# Patient Record
Sex: MALE | Race: WHITE
[De-identification: names, ages, dates, MRNs, and addresses within clinical notes are randomized per-mention and may not be internally consistent; named-entity substitution may affect disease eponyms.]

---

## 2018-05-02 ENCOUNTER — HOSPITAL ENCOUNTER (EMERGENCY)
Dept: HOSPITAL 60 - LB.ED | Age: 73
Discharge: HOME | End: 2018-05-02
Payer: MEDICARE

## 2018-05-02 VITALS — SYSTOLIC BLOOD PRESSURE: 122 MMHG | DIASTOLIC BLOOD PRESSURE: 63 MMHG

## 2018-05-02 DIAGNOSIS — Z91.09: ICD-10-CM

## 2018-05-02 DIAGNOSIS — I48.91: ICD-10-CM

## 2018-05-02 DIAGNOSIS — R07.9: Primary | ICD-10-CM

## 2018-05-02 DIAGNOSIS — Z79.899: ICD-10-CM

## 2018-05-02 RX ADMIN — NITROGLYCERIN ONE MG: 0.4 TABLET SUBLINGUAL at 15:36

## 2018-05-02 NOTE — EDM.PDOC
ED HPI GENERAL MEDICAL PROBLEM





- General


Chief Complaint: Chest Pain


Stated Complaint: left arm pain


Time Seen by Provider: 05/02/18 14:20


Source of Information: Reports: Patient, RN


History Limitations: Reports: No Limitations





- History of Present Illness


INITIAL COMMENTS - FREE TEXT/NARRATIVE: 


73 yr male presents with left arm pain, weakness and some nausea.  He was 

fishing today and holding his fishing pole and noted this pain to the left arm.

  States hx of a-fib and take Metoprolol and xarelto and pravastatin.  States 

he ate out last night and doesn't know if he had something to eat that upset 

his stomach.  States he hasn't ate today, but has been drinking water and did 

have 2 bottles of water today.  Pt is alert, talkative and no acute distress.  

States he does come up here to [place] about 3x/year.  He used to work with 

[employer]  and is retired now.  Rates his pain a "1" and states this is the best he 

has felt today.  Pain was a 3/4 at its worse.  Will do work-up for cardiac with 

EKG, tropnons, and CBC and CMP.  Vital signs are stable and EKG shows Sinus 

rhythm with aberrant contraction.  





Onset: Today


Onset Date: 05/02/18


Onset Time: 10:30


Duration: Improving


Location: Reports: Upper Extremity, Left


Severity: Mild


Associated Symptoms: Reports: Other (pain into rib cage, side and back)


  ** Left Shoulder


Pain Score (Numeric/FACES): 1





- Related Data


 Allergies











Allergy/AdvReac Type Severity Reaction Status Date / Time


 


cat dander Allergy  Itching Verified 05/02/18 14:44











Home Meds: 


 Home Meds





Metoprolol Tartrate 25 mg PO BID 05/02/18 [History]


Pravastatin [Pravachol] 40 mg PO BEDTIME 05/02/18 [History]


Rivaroxaban [Xarelto] 20 mg PO DAILY 05/02/18 [History]











ED ROS GENERAL





- Review of Systems


Review Of Systems: See Below


Constitutional: Reports: Weakness


HEENT: Reports: Glasses


Respiratory: Reports: No Symptoms


Cardiovascular: Reports: Other (hx of a-fib and is taking metoprolol and 

xarelto.)


GI/Abdominal: Reports: Nausea.  Denies: Abdominal Pain, Decreased Appetite


Musculoskeletal: Reports: Arm Pain (left arm pain)


Skin: Reports: No Symptoms


Neurological: Reports: Weakness





ED EXAM, GENERAL





- Physical Exam


Exam: See Below


Exam Limited By: No Limitations


General Appearance: Alert, No Apparent Distress


Ears: Hearing Grossly Normal


Throat/Mouth: No Airway Compromise


Head: Atraumatic, Normocephalic


Neck: Supple, Non-Tender


Respiratory/Chest: No Respiratory Distress, Lungs Clear, Normal Breath Sounds, 

Chest Non-Tender


Cardiovascular: Normal Peripheral Pulses, Regular Rate, Rhythm, No Edema





Course





- Vital Signs


Last Recorded V/S: 


 Last Vital Signs











Temp  98.9 F   05/02/18 14:20


 


Pulse  64   05/02/18 14:55


 


Resp  18   05/02/18 14:55


 


BP  122/63   05/02/18 15:36


 


Pulse Ox  97   05/02/18 14:34














- Orders/Labs/Meds


Orders: 


 Active Orders 24 hr











 Category Date Time Status


 


 Cardiac Monitoring [RC] .As Directed Care  05/02/18 14:33 Active


 


 EKG Documentation Completion [RC] ASDIRECTED Care  05/02/18 14:34 Active


 


 Saline Lock Insert [OM.PC] Stat Oth  05/02/18 14:33 Ordered











Labs: 


 Laboratory Tests











  05/02/18 05/02/18 Range/Units





  14:40 14:40 


 


WBC  6.7   (4.0-11.0)  K/uL


 


RBC  4.36 L   (4.50-6.50)  M/uL


 


Hgb  13.1   (13.0-18.0)  g/dL


 


Hct  38.1 L   (40.0-54.0)  %


 


MCV  87   (76-96)  fL


 


MCH  30.0   (27.0-32.0)  pg


 


MCHC  34.4   (31.0-35.0)  g/dL


 


RDW  12.9   (11.0-16.0)  %


 


Plt Count  163   (150-400)  K/uL


 


MPV  9.7   (6.0-10.0)  fL


 


Neut % (Auto)  84.8 H   (45.0-70.0)  %


 


Lymph % (Auto)  6.7 L   (20.0-40.0)  %


 


Mono % (Auto)  7.8   (3.0-10.0)  %


 


Eos % (Auto)  0.6 L   (1.0-5.0)  %


 


Baso % (Auto)  0.1   (0.0-0.5)  %


 


Neut # (Auto)  5.67   (2.00-7.50)  K/uL


 


Lymph # (Auto)  0.45 L   (1.50-4.00)  K/uL


 


Mono # (Auto)  0.52   (0.20-0.80)  K/uL


 


Eos # (Auto)  0.04   (0.04-0.40)  K/uL


 


Baso # (Auto)  0.01 L   (0.02-0.10)  K/uL


 


Sodium   141  (136-145)  mmol/L


 


Potassium   3.9  (3.5-5.1)  mmol/L


 


Chloride   103  ()  mmol/L


 


Carbon Dioxide   27.0  (21.0-32.0)  mmol/L


 


Anion Gap   14.9  (5.0-15.0)  mmol/L


 


BUN   20  (8-26)  mg/dL


 


Creatinine   0.98  (0.70-1.30)  mg/dL


 


Est Cr Clr Drug Dosing   65.13  mL/min


 


Estimated GFR (MDRD)   > 60  (>60)  MLS/MIN


 


BUN/Creatinine Ratio   20.4  (6-25)  


 


Glucose   108 H  ()  mg/dL


 


Calcium   8.7  (8.5-10.1)  mg/dL


 


Total Bilirubin   1.0  (0.0-1.0)  mg/dL


 


AST   29  (15-37)  U/L


 


ALT   32  (12-78)  U/L


 


Alkaline Phosphatase   56  ()  U/L


 


Troponin I   < 0.017  (0.000-0.060)  ng/mL


 


Total Protein   7.2  (6.4-8.2)  g/dL


 


Albumin   3.8  (3.4-5.0)  g/dL


 


Globulin   3.4  (2.2-4.2)  g/dL


 


Albumin/Globulin Ratio   1.1  (0.8-2.0)  











Meds: 


Medications














Discontinued Medications














Generic Name Dose Route Start Last Admin





  Trade Name Freq  PRN Reason Stop Dose Admin


 


Nitroglycerin  0.4 mg  05/02/18 15:35  05/02/18 15:36





  Nitrostat  SL  05/02/18 15:36  0.4 mg





  ONETIME ONE   Administration





     





     





     





     


 


Sodium Chloride  10 ml  05/02/18 14:33  





  Saline Flush  FLUSH   





  ASDIRECTED PRN   





  Keep Vein Open   





     





     





     














- Re-Assessments/Exams


Free Text/Narrative Re-Assessment/Exam: 





05/02/18 16:52  LE


Pt has remained stable with vital signs stable and no return of chest pain.  

States some pain to left shoulder as standing and moving in ER.  No increase in 

pain with ambulation and no shortness of breath.  Consulted with Dr Juares and Dr Juares assessed pt and reviewed EKG with pt.  Lab results for troponins are 

negative, 4 hour after the initial chest pain and arm pain started.  Nitro, 1 

tablet sl given while in ER under advisement of Dr Juares and pain resolved.  With 

EKG of fasicular block and resolution of pain with nitro, pain may be related 

to angina.  Recommend F/U with PCP upon return to home and results of today's 

visit will be sent with pt.  Will send nitro home with pt and instructed to 

take nitro, if return of chest pain. Reviewed side effects of medication and if 

pain returns tonight and worse, return to ER for further assessment.  Pt states 

understanding.  Continue medications as at home.  Pt discharged ambulatory with 

friend, who will drive pt to [facility] for evening.








Departure





- Departure


Time of Disposition: 16:43


Disposition: Home, Self-Care 01


Condition: Good


Clinical Impression: 


 Chest pain





Instructions:  Nonspecific Chest Pain, Easy-to-Read, Angina Pectoris


Referrals: 


PCP,None [Primary Care Provider] - 


Forms:  ED Department Discharge


Care Plan Goals: 


Follow up with primary provider as needed. May take 1 nitro if chest pain 

returns. If chest pain continues return to ER.





- Problem List & Annotations


(1) Chest pain


SNOMED Code(s): 10443549


   Code(s): R07.9 - CHEST PAIN, UNSPECIFIED   Status: Acute   





- Problem List Review


Problem List Initiated/Reviewed/Updated: Yes





- My Orders


Last 24 Hours: 


My Active Orders





05/02/18 14:33


Cardiac Monitoring [RC] .As Directed 


Saline Lock Insert [OM.PC] Stat 





05/02/18 14:34


EKG Documentation Completion [RC] ASDIRECTED 














- Assessment/Plan


Last 24 Hours: 


My Active Orders





05/02/18 14:33


Cardiac Monitoring [RC] .As Directed 


Saline Lock Insert [OM.PC] Stat 





05/02/18 14:34


EKG Documentation Completion [RC] ASDIRECTED 











Assessment:: 


chest pain   


History per pt:


Hx of hypertension, controlled with medication.


Hx of atrial fibrillation


hx of hyperlipidemia, controlled with medication.





Plan: 


F/U with PCP upon return to home.


Home medications as prescribed.


Fluids and  diet as tolerated.


Nitro 1 tablet SL if chest pain.


Return to ER if pain increases despite use of nitro.